# Patient Record
Sex: FEMALE | Race: BLACK OR AFRICAN AMERICAN | URBAN - METROPOLITAN AREA
[De-identification: names, ages, dates, MRNs, and addresses within clinical notes are randomized per-mention and may not be internally consistent; named-entity substitution may affect disease eponyms.]

---

## 2018-09-25 ENCOUNTER — OFFICE VISIT (OUTPATIENT)
Dept: FAMILY MEDICINE CLINIC | Age: 58
End: 2018-09-25

## 2018-09-25 VITALS
HEIGHT: 62 IN | DIASTOLIC BLOOD PRESSURE: 80 MMHG | TEMPERATURE: 99 F | HEART RATE: 77 BPM | RESPIRATION RATE: 18 BRPM | SYSTOLIC BLOOD PRESSURE: 138 MMHG | WEIGHT: 110.8 LBS | BODY MASS INDEX: 20.39 KG/M2 | OXYGEN SATURATION: 98 %

## 2018-09-25 DIAGNOSIS — C85.90 NON-HODGKIN'S LYMPHOMA, UNSPECIFIED BODY REGION, UNSPECIFIED NON-HODGKIN LYMPHOMA TYPE (HCC): ICD-10-CM

## 2018-09-25 DIAGNOSIS — Z23 ENCOUNTER FOR IMMUNIZATION: ICD-10-CM

## 2018-09-25 DIAGNOSIS — Z11.59 NEED FOR HEPATITIS C SCREENING TEST: ICD-10-CM

## 2018-09-25 DIAGNOSIS — M70.71 BURSITIS OF RIGHT HIP, UNSPECIFIED BURSA: ICD-10-CM

## 2018-09-25 DIAGNOSIS — Z00.00 ANNUAL PHYSICAL EXAM: Primary | ICD-10-CM

## 2018-09-25 DIAGNOSIS — Z00.00 ANNUAL PHYSICAL EXAM: ICD-10-CM

## 2018-09-25 NOTE — MR AVS SNAPSHOT
[address] [place] [address]  [address] 6[address] [facility] [address] [phone] Patient: Zulma Ventura MRN: [id number] OCF:[birth year] Visit Information Date & Time Provider Department Dept. Phone Encounter #  
 9/25/2018  1:00 PM Dinorah Carbajal, 3 [facility] [phone] [id number] Upcoming Health Maintenance Date Due Hepatitis C Screening 1960 DTaP/Tdap/Td series (1 - Tdap) 1/18/1980 Shingrix Vaccine Age 50> (1 of 2) 1/18/2009 BREAST CANCER SCRN MAMMOGRAM 1/18/2009 FOBT Q 1 YEAR AGE 50-75 1/18/2009 PAP AKA CERVICAL CYTOLOGY 12/12/2016 Influenza Age 5 to Adult 8/1/2018 Allergies as of 9/25/2018  Review Complete On: 9/25/2018 By: Dinorah Carbajal MD  
 No Known Allergies Current Immunizations  Never Reviewed Name Date Influenza Vaccine (Quad) PF  Incomplete Not reviewed this visit You Were Diagnosed With   
  
 Codes Comments Annual physical exam    -  Primary ICD-10-CM: Z00.00 ICD-9-CM: V70.0 Non-Hodgkin's lymphoma, unspecified body region, unspecified non-Hodgkin lymphoma type ([facility]     ICD-10-CM: C85.90 ICD-9-CM: 202.80 Bursitis of right hip, unspecified bursa     ICD-10-CM: M70.71 ICD-9-CM: 726.5 Need for hepatitis C screening test     ICD-10-CM: Z11.59 
ICD-9-CM: V73.89 Encounter for immunization     ICD-10-CM: C55 ICD-9-CM: V03.89 Vitals BP Pulse Temp Resp Height(growth percentile) Weight(growth percentile) 138/80 (BP 1 Location: Left arm, BP Patient Position: Sitting) 77 99 °F (37.2 °C) (Oral) 18 5' 2\" (1.575 m) 110 lb 12.8 oz (50.3 kg) SpO2 BMI OB Status Smoking Status 98% 20.27 kg/m2 Postmenopausal Never Smoker BMI and BSA Data Body Mass Index Body Surface Area  
 20.27 kg/m 2 1.48 m 2 Preferred Pharmacy Pharmacy Name Phone [address], [address] [facility] [phone] Your Updated Medication List  
  
   
This list is accurate as of 9/25/18  1:41 PM.  Always use your most recent med list.  
  
  
  
  
 PERCOCET 5-325 mg per tablet Generic drug:  oxyCODONE-acetaminophen Take 1 Tab by mouth every four (4) hours as needed. SENOKOT PO Take  by mouth. We Performed the Following INFLUENZA VIRUS VAC QUAD,SPLIT,PRESV FREE SYRINGE IM [id number] CPT(R)] ME IMMUNIZ ADMIN,1 SINGLE/COMB VAC/TOXOID [id number] CPT(R)] REFERRAL TO PHYSICAL THERAPY [ZSI10 Custom] To-Do List   
 09/25/2018 Lab:  CBC WITH AUTOMATED DIFF   
  
 09/25/2018 Lab:  HEPATIC FUNCTION PANEL   
  
 09/25/2018 Lab:  HEPATITIS C AB   
  
 09/25/2018 Lab:  LIPID PANEL   
  
 09/25/2018 Lab:  METABOLIC PANEL, BASIC   
  
 09/25/2018 Lab:  T4, FREE   
  
 09/25/2018 Lab:  TSH 3RD GENERATION   
  
 09/25/2018 Lab:  URINALYSIS W/ RFLX MICROSCOPIC   
  
 09/25/2018 Lab:  VITAMIN D, 25 HYDROXY Referral Information Referral ID Referred By Referred To  
  
 [id number] [facility] Not Available Visits Status Start Date End Date 1 New Request 9/25/18 9/25/19 If your referral has a status of pending review or denied, additional information will be sent to support the outcome of this decision. Patient Instructions Influenza (Flu) Vaccine (Inactivated or Recombinant): What You Need to Know Why get vaccinated? Influenza (\"flu\") is a contagious disease that spreads around the [place] every winter, usually between October and May. Flu is caused by influenza viruses and is spread mainly by coughing, sneezing, and close contact. Anyone can get flu. Flu strikes suddenly and can last several days. Symptoms vary by age, but can include: · Fever/chills. · Sore throat. · Muscle aches. · Fatigue. · Cough. · Headache. · Runny or stuffy nose.  
Flu can also lead to pneumonia and blood infections, and cause diarrhea and seizures in children. If you have a medical condition, such as heart or lung disease, flu can make it worse. Flu is more dangerous for some people. Infants and young children, people 72years of age and older, pregnant women, and people with certain health conditions or a weakened immune system are at greatest risk. Each year thousands of people in the [facility] die from flu, and many more are hospitalized. Flu vaccine can: · Keep you from getting flu. · Make flu less severe if you do get it. · Keep you from spreading flu to your family and other people. Inactivated and recombinant flu vaccines A dose of flu vaccine is recommended every flu season. Children 6 months through 6years of age may need two doses during the same flu season. Everyone else needs only one dose each flu season. Some inactivated flu vaccines contain a very small amount of a mercury-based preservative called thimerosal. Studies have not shown thimerosal in vaccines to be harmful, but flu vaccines that do not contain thimerosal are available. There is no live flu virus in flu shots. They cannot cause the flu. There are many flu viruses, and they are always changing. Each year a new flu vaccine is made to protect against three or four viruses that are likely to cause disease in the upcoming flu season. But even when the vaccine doesn't exactly match these viruses, it may still provide some protection. Flu vaccine cannot prevent: · Flu that is caused by a virus not covered by the vaccine. · Illnesses that look like flu but are not. Some people should not get this vaccine Tell the person who is giving you the vaccine: · If you have any severe (life-threatening) allergies. If you ever had a life-threatening allergic reaction after a dose of flu vaccine, or have a severe allergy to any part of this vaccine, you may be advised not to get vaccinated.  Most, but not all, types of flu vaccine contain a small amount of egg protein. · If you ever had Guillain-Barré syndrome (also called GBS) Some people with a history of GBS should not get this vaccine. This should be discussed with your doctor. · If you are not feeling well. It is usually okay to get flu vaccine when you have a mild illness, but you might be asked to come back when you feel better. Risks of a vaccine reaction With any medicine, including vaccines, there is a chance of reactions. These are usually mild and go away on their own, but serious reactions are also possible. Most people who get a flu shot do not have any problems with it. Minor problems following a flu shot include: · Soreness, redness, or swelling where the shot was given · Hoarseness · Sore, red or itchy eyes · Cough · Fever · Aches · Headache · Itching · Fatigue If these problems occur, they usually begin soon after the shot and last 1 or 2 days. More serious problems following a flu shot can include the following: · There may be a small increased risk of Guillain-Barré Syndrome (GBS) after inactivated flu vaccine. This risk has been estimated at 1 or 2 additional cases per million people vaccinated. This is much lower than the risk of severe complications from flu, which can be prevented by flu vaccine. · [employer] children who get the flu shot along with pneumococcal vaccine (PCV13) and/or DTaP vaccine at the same time might be slightly more likely to have a seizure caused by fever. Ask your doctor for more information. Tell your doctor if a child who is getting flu vaccine has ever had a seizure Problems that could happen after any injected vaccine: · People sometimes faint after a medical procedure, including vaccination. Sitting or lying down for about 15 minutes can help prevent fainting, and injuries caused by a fall. Tell your doctor if you feel dizzy, or have vision changes or ringing in the ears.  
· Some people get severe pain in the shoulder and have difficulty moving the arm where a shot was given. This happens very rarely. · Any medication can cause a severe allergic reaction. Such reactions from a vaccine are very rare, estimated at about 1 in a million doses, and would happen within a few minutes to a few hours after the vaccination. As with any medicine, there is a very remote chance of a vaccine causing a serious injury or death. The safety of vaccines is always being monitored. For more information, visit: www.cdc.gov/vaccinesafety/. What if there is a serious reaction? What should I look for? · Look for anything that concerns you, such as signs of a severe allergic reaction, very high fever, or unusual behavior. Signs of a severe allergic reaction can include hives, swelling of the face and throat, difficulty breathing, a fast heartbeat, dizziness, and weakness - usually within a few minutes to a few hours after the vaccination. What should I do? · If you think it is a severe allergic reaction or other emergency that can't wait, call 9-1-1 and get the person to the nearest hospital. Otherwise, call your doctor. · Reactions should be reported to the \"Vaccine Adverse Event Reporting System\" (VAERS). Your doctor should file this report, or you can do it yourself through the VAERS website at www.vaers. [facility].gov, or by calling 3-197.861.9744. [employer] does not give medical advice. The National Vaccine Injury Compensation Program 
The National Vaccine Injury Compensation Program (VICP) is a federal program that was created to compensate people who may have been injured by certain vaccines. Persons who believe they may have been injured by a vaccine can learn about the program and about filing a claim by calling 8-705.438.7218 or visiting the [employer] website at www.[facility].gov/vaccinecompensation. There is a time limit to file a claim for compensation. How can I learn more? · Ask your healthcare provider.  He or she can give you the vaccine package insert or suggest other sources of information. · Call your local or state health department. · Contact the Centers for Disease Control and Prevention (CDC): 
¨ Call 3-671.128.3664 (1-800-CDC-INFO) or ¨ Visit CDC's website at www.cdc.gov/flu Vaccine Information Statement Inactivated Influenza Vaccine 8/7/2015) 42 LUIGI Kay 122SE-45 [facility] and [employer] Centers for Disease Control and Prevention Many Vaccine Information Statements are available in [place] and other languages. See www.immunize.org/vis. Muchas hojas de información sobre vacunas están disponibles en español y en otros idiomas. Visite www.immunize.org/vis. Care instructions adapted under license by [employer] (which disclaims liability or warranty for this information). If you have questions about a medical condition or this instruction, always ask your healthcare professional. [address] any warranty or liability for your use of this information. Introducing [facility]! Eduardo Lima introduces TwentyPeople patient portal. Now you can access parts of your medical record, email your doctor's office, and request medication refills online. 1. In your internet browser, go to [email or web address]. [employer] 2. Click on the First Time User? Click Here link in the Sign In box. You will see the New Member Sign Up page. 3. Enter your TwentyPeople Access Code exactly as it appears below. You will not need to use this code after youve completed the sign-up process. If you do not sign up before the expiration date, you must request a new code. · [employer] Access Code: [email or web address] Expires: 12/24/2018  1:41 PM 
 
4. Enter the last four digits of your Social Security Number (xxxx) and Date of Birth (mm/dd/yyyy) as indicated and click Submit. You will be taken to the next sign-up page. 5. Create a TwentyPeople ID.  This will be your TwentyPeople login ID and cannot be changed, so think of one that is secure and easy to remember. 6. Create a mInfo password. You can change your password at any time. 7. Enter your Password Reset Question and Answer. This can be used at a later time if you forget your password. 8. Enter your e-mail address. You will receive e-mail notification when new information is available in [address] 9. Click Sign Up. You can now view and download portions of your medical record. 10. Click the Download Summary menu link to download a portable copy of your medical information. If you have questions, please visit the Frequently Asked Questions section of the mInfo website. Remember, mInfo is NOT to be used for urgent needs. For medical emergencies, dial 911. Now available from your iPhone and Android! Please provide this summary of care documentation to your next provider. Your primary care clinician is listed as Kristian 51. If you have any questions after today's visit, please call [phone].

## 2018-09-25 NOTE — PATIENT INSTRUCTIONS

## 2018-09-25 NOTE — PROGRESS NOTES
Sunshine Blanca is a 60 y.o. female (: 1960) presenting to address: 
Patient DECLINED flu vaccine. Chief Complaint Patient presents with [facility] Care Vitals:  
 18 1313 BP: 138/80 Pulse: 77 Resp: 18 Temp: 99 °F (37.2 °C) TempSrc: Oral  
SpO2: 98% Weight: 110 lb 12.8 oz (50.3 kg) Height: 5' 2\" (1.575 m) PainSc:   0 - No pain Hearing/Vision:  
 
 Visual Acuity Screening Right eye Left eye Both eyes Without correction:     
With correction: 20/20 20/25 20/20 Learning Assessment:  
 
Learning Assessment 2018 PRIMARY LEARNER Patient HIGHEST LEVEL OF EDUCATION - PRIMARY LEARNER  > 4 YEARS OF COLLEGE  
BARRIERS PRIMARY LEARNER NONE PRIMARY LANGUAGE ENGLISH  
LEARNER PREFERENCE PRIMARY READING  
ANSWERED BY patient RELATIONSHIP SELF Depression Screening: PHQ over the last two weeks 2018 Little interest or pleasure in doing things Not at all Feeling down, depressed, irritable, or hopeless Not at all Total Score PHQ 2 0 Fall Risk Assessment:  
No flowsheet data found. Abuse Screening:  
 
Abuse Screening Questionnaire 2018 Do you ever feel afraid of your partner? Karl Mohr Are you in a relationship with someone who physically or mentally threatens you? Karl Mohr Is it safe for you to go home? Mar Lang Coordination of Care Questionaire: 1. Have you been to the ER, urgent care clinic since your last visit? Hospitalized since your last visit? NO 
 
2. Have you seen or consulted any other health care providers outside of the [address] [place] [address] since your last visit? Include any pap smears or colon screening. NO Advanced Directive: 1. Do you have an Advanced Directive? NO 
 
2. Would you like information on Advanced Directives?  YES

## 2018-09-25 NOTE — PROGRESS NOTES
Flu shot Immunization/s administered 9/25/2018 by Loretta Viera LPN with guardian's consent. Patient tolerated procedure well. No reactions noted.

## 2018-09-25 NOTE — PROGRESS NOTES
SUBJECTIVE:  
60 y.o. female for annual routine checkup. Current Outpatient Prescriptions Medication Sig Dispense Refill  oxyCODONE-acetaminophen (PERCOCET) 5-325 mg per tablet Take 1 Tab by mouth every four (4) hours as needed.  SENNOSIDES (SENOKOT PO) Take  by mouth. Past Medical History:  
Diagnosis Date  Arm pain  Bursitis of hip, right 9/25/2018  Cancer ([facility] [address] 12/2011 Non-Hodkins Lymphoma  Chest pain  Hypercholesterolemia  Hypertension  Non Hodgkin's lymphoma ([facility] 9/25/2018 Allergies: Review of patient's allergies indicates no known allergies. No LMP recorded. Patient is postmenopausal. 
 
 
ROS:  Feeling well. No dyspnea or chest pain on exertion. No abdominal pain, change in bowel habits, black or bloody stools. No urinary tract symptoms. GYN ROS: no breast pain or new or enlarging lumps on self exam. No neurological complaints. OBJECTIVE: The patient appears well, alert, oriented x 3, in no distress. Visit Vitals  /80 (BP 1 Location: Left arm, BP Patient Position: Sitting)  Pulse 77  Temp 99 °F (37.2 °C) (Oral)  Resp 18  Ht 5' 2\" (1.575 m)  Wt 110 lb 12.8 oz (50.3 kg)  SpO2 98%  BMI 20.27 kg/m2 General appearance: alert, cooperative, no distress, appears stated age Head: Normocephalic, without obvious abnormality, atraumatic Ears: normal TM's and external ear canals AU Throat: Lips, mucosa, and tongue normal. Teeth and gums normal 
Neck: supple, symmetrical, trachea midline, no adenopathy, thyroid: not enlarged, symmetric, no tenderness/mass/nodules, no carotid bruit and no JVD Back: symmetric, no curvature. ROM normal. No CVA tenderness. Lungs: clear to auscultation bilaterally Breasts: patient declines to have breast exam. 
Heart: regular rate and rhythm, S1, S2 normal, no murmur, click, rub or gallop Abdomen: soft, non-tender. Bowel sounds normal. No masses,  no organomegaly Extremities: extremities normal, atraumatic, no cyanosis or edema Pulses: 2+ and symmetric Skin: Skin color, texture, turgor normal. No rashes or lesions Neurological is normal, no focal findings. No results found for: WBC, WBCLT, HGBPOC, HGB, HGBP, HCTPOC, HCT, PHCT, RBCH, PLT, MCV, HGBEXT, HCTEXT, PLTEXT, HGBEXT, HCTEXT, PLTEXT No results found for: NA, K, CL, CO2, AGAP, GLU, BUN, CREA, BUCR, GFRAA, GFRNA, CA, GFRAA No results found for: GPT, ALT, SGOT, GGT, GGTP, AP, APIT, APX, CBIL, TBIL, TBILI No results found for: CHOL, CHOLPOCT, CHOLX, CHLST, CHOLV, HDL, HDLPOC, LDL, LDLCPOC, LDLC, DLDLP, VLDLC, VLDL, TGLX, TRIGL, TRIGP, TGLPOCT, CHHD, CHHDX No results found for: TSH, TSH2, TSH3, TSHP, TSHELE, TT3, T3U, T3UP, FRT3, FT3, FT4, FT4P, T4, T4P, FT4T, TT7, TSHEXT, TSHEXT No results found for: Catherine Bhagat, Yimi Rodriguez, Jorge Espinal, VD3RIA ASSESSMENT:  
Diagnoses and all orders for this visit: 
 
1. Annual physical exam 
-     CBC WITH AUTOMATED DIFF; Future 
-     HEPATIC FUNCTION PANEL; Future -     LIPID PANEL; Future -     METABOLIC PANEL, BASIC; Future 
-     TSH 3RD GENERATION; Future -     T4, FREE; Future -     URINALYSIS W/ RFLX MICROSCOPIC; Future -     VITAMIN D, 25 HYDROXY; Future 2. Non-Hodgkin's lymphoma, unspecified body region, unspecified non-Hodgkin lymphoma type ([facility] 3. Bursitis of right hip, unspecified bursa 
-     REFERRAL TO PHYSICAL THERAPY 4. Need for hepatitis C screening test 
-     HEPATITIS C AB; Future 5. Encounter for immunization -     Influenza virus vaccine (QUADRIVALENT PRES FREE SYRINGE) IM ([id number]) -     [place] IMMUNIZ ADMIN,1 SINGLE/COMB VAC/TOXOID PLAN:  
Mammogram: will get copy from [facility]. DEXA: done with [facility]. Colonoscopy: done with Dr. Loras Rinne. pap smear: wit Dr. Umair Kern. Will return for labs.  
return annually or prn  
 
 
 The plan was discussed with the patient. The patient verbalized understanding and is in agreement with the plan. All medication potential side effects were discussed with the patient.

## 2018-09-28 ENCOUNTER — HOSPITAL ENCOUNTER (OUTPATIENT)
Dept: LAB | Age: 58
Discharge: HOME | End: 2018-09-28

## 2018-09-28 PROCEDURE — 99001 SPECIMEN HANDLING PT-LAB: CPT | Performed by: INTERNAL MEDICINE

## 2018-09-29 LAB
25(OH)D3+25(OH)D2 SERPL-MCNC: 37.3 NG/ML (ref 30–100)
ALBUMIN SERPL-MCNC: 4.6 G/DL (ref 3.5–5.5)
ALP SERPL-CCNC: 92 IU/L (ref 39–117)
ALT SERPL-CCNC: 9 IU/L (ref 0–32)
APPEARANCE UR: CLEAR
AST SERPL-CCNC: 22 IU/L (ref 0–40)
BASOPHILS # BLD AUTO: 0 X10E3/UL (ref 0–0.2)
BASOPHILS NFR BLD AUTO: 0 %
BILIRUB DIRECT SERPL-MCNC: 0.07 MG/DL (ref 0–0.4)
BILIRUB SERPL-MCNC: 0.2 MG/DL (ref 0–1.2)
BILIRUB UR QL STRIP: NEGATIVE
BUN SERPL-MCNC: 11 MG/DL (ref 6–24)
BUN/CREAT SERPL: 14 (ref 9–23)
CALCIUM SERPL-MCNC: 9.9 MG/DL (ref 8.7–10.2)
CHLORIDE SERPL-SCNC: 105 MMOL/L (ref 96–106)
CHOLEST SERPL-MCNC: 204 MG/DL (ref 100–199)
CO2 SERPL-SCNC: 25 MMOL/L (ref 20–29)
COLOR UR: YELLOW
CREAT SERPL-MCNC: 0.79 MG/DL (ref 0.57–1)
EOSINOPHIL # BLD AUTO: 0.1 X10E3/UL (ref 0–0.4)
EOSINOPHIL NFR BLD AUTO: 4 %
ERYTHROCYTE [DISTWIDTH] IN BLOOD BY AUTOMATED COUNT: 13.4 % (ref 12.3–15.4)
GLUCOSE SERPL-MCNC: 92 MG/DL (ref 65–99)
GLUCOSE UR QL: NEGATIVE
HCT VFR BLD AUTO: 36.3 % (ref 34–46.6)
HCV AB S/CO SERPL IA: <0.1 S/CO RATIO (ref 0–0.9)
HDLC SERPL-MCNC: 79 MG/DL
HGB BLD-MCNC: 11.5 G/DL (ref 11.1–15.9)
HGB UR QL STRIP: NEGATIVE
IMM GRANULOCYTES # BLD: 0 X10E3/UL (ref 0–0.1)
IMM GRANULOCYTES NFR BLD: 0 %
INTERPRETATION, 910389: NORMAL
KETONES UR QL STRIP: NEGATIVE
LDLC SERPL CALC-MCNC: 115 MG/DL (ref 0–99)
LEUKOCYTE ESTERASE UR QL STRIP: NEGATIVE
LYMPHOCYTES # BLD AUTO: 1.2 X10E3/UL (ref 0.7–3.1)
LYMPHOCYTES NFR BLD AUTO: 51 %
MCH RBC QN AUTO: 28.3 PG (ref 26.6–33)
MCHC RBC AUTO-ENTMCNC: 31.7 G/DL (ref 31.5–35.7)
MCV RBC AUTO: 89 FL (ref 79–97)
MICRO URNS: NORMAL
MONOCYTES # BLD AUTO: 0.2 X10E3/UL (ref 0.1–0.9)
MONOCYTES NFR BLD AUTO: 9 %
MORPHOLOGY BLD-IMP: ABNORMAL
NEUTROPHILS # BLD AUTO: 0.8 X10E3/UL (ref 1.4–7)
NEUTROPHILS NFR BLD AUTO: 36 %
NITRITE UR QL STRIP: NEGATIVE
PH UR STRIP: 5 [PH] (ref 5–7.5)
PLATELET # BLD AUTO: 214 X10E3/UL (ref 150–379)
POTASSIUM SERPL-SCNC: 4.2 MMOL/L (ref 3.5–5.2)
PROT SERPL-MCNC: 6.6 G/DL (ref 6–8.5)
PROT UR QL STRIP: NEGATIVE
RBC # BLD AUTO: 4.06 X10E6/UL (ref 3.77–5.28)
SODIUM SERPL-SCNC: 144 MMOL/L (ref 134–144)
SP GR UR: 1.02 (ref 1–1.03)
T4 FREE SERPL-MCNC: 1.17 NG/DL (ref 0.82–1.77)
TRIGL SERPL-MCNC: 49 MG/DL (ref 0–149)
TSH SERPL DL<=0.005 MIU/L-ACNC: 2.18 UIU/ML (ref 0.45–4.5)
UROBILINOGEN UR STRIP-MCNC: 0.2 MG/DL (ref 0.2–1)
VLDLC SERPL CALC-MCNC: 10 MG/DL (ref 5–40)
WBC # BLD AUTO: 2.3 X10E3/UL (ref 3.4–10.8)

## 2018-10-05 NOTE — PROGRESS NOTES
Her wbc level is low but I believe this goes with her blood condition. How often does she see heme/onc? Cholesterol mildly elevated. Watch diet and exercise. Rest of labs look good.

## 2018-10-10 ENCOUNTER — DOCUMENTATION ONLY (OUTPATIENT)
Dept: FAMILY MEDICINE CLINIC | Age: 58
End: 2018-10-10

## 2019-01-31 LAB — AMB DEXA, EXTERNAL: NORMAL

## 2019-06-10 ENCOUNTER — OFFICE VISIT (OUTPATIENT)
Dept: FAMILY MEDICINE CLINIC | Age: 59
End: 2019-06-10

## 2019-06-10 VITALS
BODY MASS INDEX: 20.98 KG/M2 | HEIGHT: 62 IN | DIASTOLIC BLOOD PRESSURE: 78 MMHG | OXYGEN SATURATION: 97 % | WEIGHT: 114 LBS | RESPIRATION RATE: 16 BRPM | SYSTOLIC BLOOD PRESSURE: 134 MMHG | HEART RATE: 87 BPM | TEMPERATURE: 98.3 F

## 2019-06-10 DIAGNOSIS — M85.80 OSTEOPENIA, UNSPECIFIED LOCATION: ICD-10-CM

## 2019-06-10 DIAGNOSIS — M25.531 RIGHT WRIST PAIN: ICD-10-CM

## 2019-06-10 DIAGNOSIS — E78.49 OTHER HYPERLIPIDEMIA: Primary | ICD-10-CM

## 2019-06-10 DIAGNOSIS — E78.49 OTHER HYPERLIPIDEMIA: ICD-10-CM

## 2019-06-10 RX ORDER — RALOXIFENE HYDROCHLORIDE 60 MG/1
TABLET, FILM COATED ORAL DAILY
COMMUNITY

## 2019-06-10 NOTE — PROGRESS NOTES
Lai Chester is a 60 y.o. female (: 1960) presenting to address:    Chief Complaint   Patient presents with    Cholesterol Problem     follow up       Vitals:    06/10/19 0745   BP: 134/78   Pulse: 87   Resp: 16   Temp: 98.3 °F (36.8 °C)   TempSrc: Oral   SpO2: 97%   Weight: 114 lb (51.7 kg)   Height: 5' 2\" (1.575 m)   PainSc:   0 - No pain       Hearing/Vision:   No exam data present    Learning Assessment:     Learning Assessment 2018   PRIMARY LEARNER Patient   HIGHEST LEVEL OF EDUCATION - PRIMARY LEARNER  > 4 YEARS OF COLLEGE   BARRIERS PRIMARY LEARNER NONE   PRIMARY LANGUAGE ENGLISH   LEARNER PREFERENCE PRIMARY READING   ANSWERED BY patient   RELATIONSHIP SELF     Depression Screening:     3 most recent PHQ Screens 6/10/2019   Little interest or pleasure in doing things Not at all   Feeling down, depressed, irritable, or hopeless Not at all   Total Score PHQ 2 0     Fall Risk Assessment:   No flowsheet data found. Abuse Screening:     Abuse Screening Questionnaire 2018   Do you ever feel afraid of your partner? N   Are you in a relationship with someone who physically or mentally threatens you? N   Is it safe for you to go home? Y     Coordination of Care Questionaire:   1. Have you been to the ER, urgent care clinic since your last visit? Hospitalized since your last visit? Yes [facility]    2. Have you seen or consulted any other health care providers outside of the [address] [place] [address] since your last visit? Include any pap smears or colon screening. NO    Advanced Directive:   1. Do you have an Advanced Directive? NO    2. Would you like information on Advanced Directives?  NO

## 2019-06-10 NOTE — PATIENT INSTRUCTIONS
Cholesterol and Triglycerides Tests: About These Tests What are they? Cholesterol and triglycerides tests measure the amount of fats in your blood. These fats have both \"good\" (HDL) and \"bad\" (LDL) cholesterol. Why are these tests done? These tests are done to help find out your risk of a heart attack and stroke. They can help your doctor find out how well medicine is working for some health problems. How can you prepare for these tests? · Your doctor may tell you to fast before your tests. This means that you do not eat or drink anything except water for 9 to 12 hours before the tests. In most cases, you can take your medicines with water the morning of the test. 
· Do not eat high-fat foods the night before the tests. · Do not drink alcohol or do intense exercise the night before the tests. · Be sure to tell your doctor about all the over-the-counter and prescription medicines and herbs or other supplements you take. They can affect the results of these tests. What happens during these tests? A health professional takes a sample of your blood. What else should you know about these tests? Your cholesterol levels can help your doctor find out your risk for having a heart attack or stroke. But it's not just about your cholesterol. Your doctor uses your cholesterol levels plus other things to calculate your risk. These include: 
· Your blood pressure. · Whether or not you have diabetes. · Your age, sex, and race. · Whether or not you smoke. You and your doctor can talk about whether you need to lower your risk and what treatment is best for you. Where can you learn more? Go to http://yeyo-donald.info/. Enter U551 in the search box to learn more about \"Cholesterol and Triglycerides Tests: About These Tests. \" Current as of: July 22, 2018 Content Version: 11.9 © 5897-5555 [employer].  Care instructions adapted under license by [address] (which disclaims liability or warranty for this information). If you have questions about a medical condition or this instruction, always ask your healthcare professional. [address] any warranty or liability for your use of this information.

## 2019-06-10 NOTE — PROGRESS NOTES
Assessment/Plan:    *Diagnoses and all orders for this visit:    1. Other hyperlipidemia  -     LIPID PANEL; Future    2. Right wrist pain  -     XR WRIST RT AP/LAT/OBL MIN 3V; Future    3. Osteopenia, unspecified location        Physical in Sept / Oct.    Will return for above labs. The plan was discussed with the patient. The patient verbalized understanding and is in agreement with the plan. All medication potential side effects were discussed with the patient.    -------------------------------------------------------------------------------------------------------------------        Sheela Ferreira is a 60 y.o. female and presents with Cholesterol Problem (follow up)         Subjective:  Pt has been doing well. Her only new complaint is from her RT wrist that has been giving her pain. It is mild to moderate and intermittent, certain movements will remind her of this pain. Needs to have her cholesterol repeated, was elevated last visit. She has been started on Evista for her osteopenia, reviewed DEXA report in deeplocal system. Her Gyn is writing the Rx.      ROS:  Review of Systems - Negative except as above        The problem list was updated as a part of today's visit. Patient Active Problem List   Diagnosis Code    Hypertension I10    Non Hodgkin's lymphoma ([facility] [address] C85.90    Bursitis of hip, right M70.71       The PSH, FH were reviewed. SH:  Social History     Tobacco Use    Smoking status: Never Smoker    Smokeless tobacco: Never Used   Substance Use Topics    Alcohol use: No    Drug use: No       Medications/Allergies:  Current Outpatient Medications on File Prior to Visit   Medication Sig Dispense Refill    raloxifene (EVISTA) 60 mg tablet Take  by mouth daily. No current facility-administered medications on file prior to visit.          No Known Allergies      Health Maintenance:   Health Maintenance   Topic Date Due    Pneumococcal 0-64 years (1 of 3 - PCV13) 01/18/1965    Shingrix Vaccine Age 50> (1 of 2) 01/18/2009    Bone Densitometry  03/20/2019    Influenza Age 9 to Adult  08/01/2019    BREAST CANCER SCRN MAMMOGRAM  01/08/2021    PAP AKA CERVICAL CYTOLOGY  01/15/2022    COLONOSCOPY  01/24/2023    Bone Densitometry (Dexa) Screening  01/18/2024    DTaP/Tdap/Td series (2 - Td) 12/16/2025    Hepatitis C Screening  Completed       Objective:  Visit Vitals  /78 (BP 1 Location: Left arm, BP Patient Position: Sitting)   Pulse 87   Temp 98.3 °F (36.8 °C) (Oral)   Resp 16   Ht 5' 2\" (1.575 m)   Wt 114 lb (51.7 kg)   SpO2 97%   BMI 20.85 kg/m²          Nurses notes and VS reviewed. Physical Examination: General appearance - alert, well appearing, and in no distress  Chest - clear to auscultation, no wheezes, rales or rhonchi, symmetric air entry  Heart - normal rate, regular rhythm, normal S1, S2, no murmurs, rubs, clicks or gallops  Musculoskeletal - abnormal exam of right wrist: +swelling along the lateral aspect, TTP. Labwork and Ancillary Studies:    CBC w/Diff  Lab Results   Component Value Date/Time    WBC 2.3 (LL) 09/28/2018 08:35 AM    HGB 11.5 09/28/2018 08:35 AM    PLATELET 072 92/86/6270 08:35 AM         Basic Metabolic Profile  Lab Results   Component Value Date/Time    Sodium 144 09/28/2018 08:35 AM    Potassium 4.2 09/28/2018 08:35 AM    Chloride 105 09/28/2018 08:35 AM    CO2 25 09/28/2018 08:35 AM    Glucose 92 09/28/2018 08:35 AM    BUN 11 09/28/2018 08:35 AM    Creatinine 0.79 09/28/2018 08:35 AM    BUN/Creatinine ratio 14 09/28/2018 08:35 AM    GFR est AA 95 09/28/2018 08:35 AM    GFR est non-AA 82 09/28/2018 08:35 AM    Calcium 9.9 09/28/2018 08:35 AM         LFT  Lab Results   Component Value Date/Time    ALT (SGPT) 9 09/28/2018 08:35 AM    AST (SGOT) 22 09/28/2018 08:35 AM    Alk.  phosphatase 92 09/28/2018 08:35 AM    Bilirubin, direct 0.07 09/28/2018 08:35 AM    Bilirubin, total 0.2 09/28/2018 08:35 AM Cholesterol  Lab Results   Component Value Date/Time    Cholesterol, total 204 (H) 09/28/2018 08:35 AM    HDL Cholesterol 79 09/28/2018 08:35 AM    LDL, calculated 115 (H) 09/28/2018 08:35 AM    Triglyceride 49 09/28/2018 08:35 AM

## 2019-06-25 LAB
CHOLEST SERPL-MCNC: 206 MG/DL (ref 100–199)
HDLC SERPL-MCNC: 96 MG/DL
INTERPRETATION, 910389: NORMAL
LDLC SERPL CALC-MCNC: 101 MG/DL (ref 0–99)
TRIGL SERPL-MCNC: 46 MG/DL (ref 0–149)
VLDLC SERPL CALC-MCNC: 9 MG/DL (ref 5–40)

## 2019-12-31 ENCOUNTER — TELEPHONE (OUTPATIENT)
Dept: FAMILY MEDICINE CLINIC | Age: 59
End: 2019-12-31

## 2019-12-31 NOTE — TELEPHONE ENCOUNTER
Patient has an upcoming appointment for a physical and needs labs placed.  Please place labs    Future Appointments   Date Time Provider Benigno Mary   1/17/2020  7:45 AM John Abbasi MD [facility]

## 2020-01-01 DIAGNOSIS — Z00.00 ANNUAL PHYSICAL EXAM: Primary | ICD-10-CM

## 2020-02-11 ENCOUNTER — OFFICE VISIT (OUTPATIENT)
Dept: FAMILY MEDICINE CLINIC | Age: 60
End: 2020-02-11

## 2020-02-11 VITALS
BODY MASS INDEX: 20.43 KG/M2 | TEMPERATURE: 99.5 F | WEIGHT: 111 LBS | OXYGEN SATURATION: 99 % | RESPIRATION RATE: 16 BRPM | HEIGHT: 62 IN | DIASTOLIC BLOOD PRESSURE: 84 MMHG | HEART RATE: 80 BPM | SYSTOLIC BLOOD PRESSURE: 150 MMHG

## 2020-02-11 DIAGNOSIS — Z00.00 ANNUAL PHYSICAL EXAM: Primary | ICD-10-CM

## 2020-02-11 DIAGNOSIS — Z23 ENCOUNTER FOR IMMUNIZATION: ICD-10-CM

## 2020-02-11 DIAGNOSIS — T45.1X5A CHEMOTHERAPY-INDUCED NEUROPATHY (HCC): ICD-10-CM

## 2020-02-11 DIAGNOSIS — R07.89 OTHER CHEST PAIN: ICD-10-CM

## 2020-02-11 DIAGNOSIS — E78.49 OTHER HYPERLIPIDEMIA: ICD-10-CM

## 2020-02-11 DIAGNOSIS — G62.0 CHEMOTHERAPY-INDUCED NEUROPATHY (HCC): ICD-10-CM

## 2020-02-11 DIAGNOSIS — I10 ESSENTIAL HYPERTENSION: ICD-10-CM

## 2020-02-11 RX ORDER — GABAPENTIN 300 MG/1
300 CAPSULE ORAL
Qty: 30 CAP | Refills: 0 | Status: SHIPPED | OUTPATIENT
Start: 2020-02-11 | End: 2020-05-27 | Stop reason: SDUPTHER

## 2020-02-11 NOTE — PROGRESS NOTES
SUBJECTIVE:   63 y.o. female for annual routine checkup. Reports ongoing issue with neuropathic pain involving the LT back region for years. 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 She did well for a duration when the pain just resolved on its own but in recent months, it has returned. Not a daily pain but can occur suddenly and can disturb her sleep. Has also had an on going issue with pain in her chest.  Is followed regularly by Dr. Marce Zepeda for her previous Lymphoma, is up to date with her mammo as well. Current Outpatient Medications   Medication Sig Dispense Refill    calcium-cholecalciferol, d3, (CALCIUM 600 + D) 600-125 mg-unit tab Take  by mouth two (2) times a day.  raloxifene (EVISTA) 60 mg tablet Take  by mouth daily. Past Medical History:   Diagnosis Date    Arm pain     Bursitis of hip, right 9/25/2018    Cancer ([facility] [address] 12/2011    Non-Hodkins Lymphoma    Chest pain     Hypercholesterolemia     Hypertension     Non Hodgkin's lymphoma ([facility] [address] 9/25/2018    Osteopenia     managed by her Gynecologist       Allergies: Patient has no known allergies. No LMP recorded. Patient is postmenopausal.      ROS:  Feeling well. No dyspnea or chest pain on exertion. No abdominal pain, change in bowel habits, black or bloody stools. No urinary tract symptoms. GYN ROS: no breast pain or new or enlarging lumps on self exam. No neurological complaints. OBJECTIVE:   The patient appears well, alert, oriented x 3, in no distress.     Visit Vitals  /84   Pulse 80   Temp 99.5 °F (37.5 °C) (Oral)   Resp 16   Ht 5' 2\" (1.575 m)   Wt 111 lb (50.3 kg)   SpO2 99%   BMI 20.30 kg/m²       General appearance: alert, cooperative, no distress, appears stated age  Head: Normocephalic, without obvious abnormality, atraumatic  Ears: normal TM's and external ear canals AU  Throat: Lips, mucosa, and tongue normal. Teeth and gums normal  Neck: supple, symmetrical, trachea midline, no adenopathy, thyroid: not enlarged, symmetric, no tenderness/mass/nodules, no carotid bruit and no JVD  Back: symmetric, no curvature. ROM normal. No CVA tenderness. Lungs: clear to auscultation bilaterally  Breasts: patient declines to have breast exam.  Heart: regular rate and rhythm, S1, S2 normal, no murmur, click, rub or gallop  Abdomen: soft, non-tender. Bowel sounds normal. No masses,  no organomegaly  Extremities: extremities normal, atraumatic, no cyanosis or edema  Pulses: 2+ and symmetric  Skin: Skin color, texture, turgor normal. No rashes or lesions  Neurological is normal, no focal findings. Lab Results   Component Value Date/Time    WBC 2.3 (LL) 09/28/2018 08:35 AM    HGB 11.5 09/28/2018 08:35 AM    HCT 36.3 09/28/2018 08:35 AM    PLATELET 413 06/76/6479 08:35 AM    MCV 89 09/28/2018 08:35 AM         Lab Results   Component Value Date/Time    Sodium 144 09/28/2018 08:35 AM    Potassium 4.2 09/28/2018 08:35 AM    Chloride 105 09/28/2018 08:35 AM    CO2 25 09/28/2018 08:35 AM    Glucose 92 09/28/2018 08:35 AM    BUN 11 09/28/2018 08:35 AM    Creatinine 0.79 09/28/2018 08:35 AM    BUN/Creatinine ratio 14 09/28/2018 08:35 AM    GFR est AA 95 09/28/2018 08:35 AM    GFR est non-AA 82 09/28/2018 08:35 AM    Calcium 9.9 09/28/2018 08:35 AM         Lab Results   Component Value Date/Time    ALT (SGPT) 9 09/28/2018 08:35 AM    AST (SGOT) 22 09/28/2018 08:35 AM    Alk.  phosphatase 92 09/28/2018 08:35 AM    Bilirubin, direct 0.07 09/28/2018 08:35 AM    Bilirubin, total 0.2 09/28/2018 08:35 AM         Lab Results   Component Value Date/Time    Cholesterol, total 206 (H) 06/24/2019 08:43 AM    HDL Cholesterol 96 06/24/2019 08:43 AM    LDL, calculated 101 (H) 06/24/2019 08:43 AM    VLDL, calculated 9 06/24/2019 08:43 AM    Triglyceride 46 06/24/2019 08:43 AM         Lab Results   Component Value Date/Time TSH 2.180 09/28/2018 08:35 AM    T4, Free 1.17 09/28/2018 08:35 AM         Lab Results   Component Value Date/Time    VITAMIN D, 25-HYDROXY 37.3 09/28/2018 08:35 AM             ASSESSMENT:   Diagnoses and all orders for this visit:    1. Annual physical exam    2. Other hyperlipidemia    3. Essential hypertension          PLAN:   Mammogram: utd. DEXA: utd  Colonoscopy: utd  Pap: with Gyn      BP unusually high. Will return after above tests and will discuss results and BP repeat. Will do BW later this week. The plan was discussed with the patient. The patient verbalized understanding and is in agreement with the plan. All medication potential side effects were discussed with the patient.

## 2020-02-11 NOTE — PROGRESS NOTES
Ange Ulrich is a 63 y.o. female (: 1960) presenting to address:    Chief Complaint   Patient presents with    Chest Pain     left breast area . over a year sometimes more intense . has seen breast specialist for it last year and had MRI done which was normal .     Complete Physical       Vitals:    20 1454   BP: 159/84   Pulse: 80   Resp: 16   Temp: 99.5 °F (37.5 °C)   TempSrc: Oral   SpO2: 99%   Weight: 111 lb (50.3 kg)   Height: 5' 2\" (1.575 m)   PainSc:   2   PainLoc: Chest       Hearing/Vision:      Visual Acuity Screening    Right eye Left eye Both eyes   Without correction:      With correction: 20/25 20/25 20/20       Learning Assessment:     Learning Assessment 2018   PRIMARY LEARNER Patient   HIGHEST LEVEL OF EDUCATION - PRIMARY LEARNER  > 4 YEARS OF COLLEGE   BARRIERS PRIMARY LEARNER NONE   PRIMARY LANGUAGE ENGLISH   LEARNER PREFERENCE PRIMARY READING   ANSWERED BY patient   RELATIONSHIP SELF     Depression Screening:     3 most recent PHQ Screens 2020   Little interest or pleasure in doing things Not at all   Feeling down, depressed, irritable, or hopeless Not at all   Total Score PHQ 2 0     Fall Risk Assessment:     Fall Risk Assessment, last 12 mths 2020   Able to walk? Yes   Fall in past 12 months? No     Abuse Screening:     Abuse Screening Questionnaire 2020   Do you ever feel afraid of your partner? N   Are you in a relationship with someone who physically or mentally threatens you? N   Is it safe for you to go home? Y     Coordination of Care Questionaire:   1. Have you been to the ER, urgent care clinic since your last visit? Hospitalized since your last visit? NO    2. Have you seen or consulted any other health care providers outside of the [address] [place] [address] since your last visit? Include any pap smears or colon screening. NO    Advanced Directive:   1. Do you have an Advanced Directive? NO    2.  Would you like information on Advanced Directives? NO      Flu shot Immunization/s administered 2/11/2020 by Harvest Primrose, LPN with guardian's consent. Patient tolerated procedure well. No reactions noted.

## 2020-02-11 NOTE — PATIENT INSTRUCTIONS
Vaccine Information Statement Influenza (Flu) Vaccine (Inactivated or Recombinant): What You Need to Know Many Vaccine Information Statements are available in [place]ic and other languages. See www.immunize.org/vis Hojas de información sobre vacunas están disponibles en español y en muchos otros idiomas. Visite www.immunize.org/vis 1. Why get vaccinated? Influenza vaccine can prevent influenza (flu). Flu is a contagious disease that spreads around the [place] [facility] every year, usually between October and May. Anyone can get the flu, but it is more dangerous for some people. Infants and young children, people 72years of age and older, pregnant women, and people with certain health conditions or a weakened immune system are at greatest risk of flu complications. Pneumonia, bronchitis, sinus infections and ear infections are examples of flu-related complications. If you have a medical condition, such as heart disease, cancer or diabetes, flu can make it worse. Flu can cause fever and chills, sore throat, muscle aches, fatigue, cough, headache, and runny or stuffy nose. Some people may have vomiting and diarrhea, though this is more common in children than adults. Each year thousands of people in the [facility] die from flu, and many more are hospitalized. Flu vaccine prevents millions of illnesses and flu-related visits to the doctor each year. 2. Influenza vaccines CDC recommends everyone 10months of age and older get vaccinated every flu season. Children 6 months through 6years of age may need 2 doses during a single flu season. Everyone else needs only 1 dose each flu season. It takes about 2 weeks for protection to develop after vaccination. There are many flu viruses, and they are always changing. Each year a new flu vaccine is made to protect against three or four viruses that are likely to cause disease in the upcoming flu season.  Even when the vaccine doesnt exactly match these viruses, it may still provide some protection. Influenza vaccine does not cause flu. Influenza vaccine may be given at the same time as other vaccines. 3. Talk with your health care provider Tell your vaccine provider if the person getting the vaccine: 
 Has had an allergic reaction after a previous dose of influenza vaccine, or has any severe, life-threatening allergies.  Has ever had Guillain-Barré Syndrome (also called GBS). In some cases, your health care provider may decide to postpone influenza vaccination to a future visit. People with minor illnesses, such as a cold, may be vaccinated. People who are moderately or severely ill should usually wait until they recover before getting influenza vaccine. Your health care provider can give you more information. 4. Risks of a reaction  Soreness, redness, and swelling where shot is given, fever, muscle aches, and headache can happen after influenza vaccine.  There may be a very small increased risk of Guillain-Barré Syndrome (GBS) after inactivated influenza vaccine (the flu shot). [facility] children who get the flu shot along with pneumococcal vaccine (PCV13), and/or DTaP vaccine at the same time might be slightly more likely to have a seizure caused by fever. Tell your health care provider if a child who is getting flu vaccine has ever had a seizure. People sometimes faint after medical procedures, including vaccination. Tell your provider if you feel dizzy or have vision changes or ringing in the ears. As with any medicine, there is a very remote chance of a vaccine causing a severe allergic reaction, other serious injury, or death. 5. What if there is a serious problem? An allergic reaction could occur after the vaccinated person leaves the clinic.  If you see signs of a severe allergic reaction (hives, swelling of the face and throat, difficulty breathing, a fast heartbeat, dizziness, or weakness), call 9-1-1 and get the person to the nearest hospital. 
 
For other signs that concern you, call your health care provider. Adverse reactions should be reported to the Vaccine Adverse Event Reporting System (VAERS). Your health care provider will usually file this report, or you can do it yourself. Visit the VAERS website at www.vaers. hhs.gov or call 4-427.479.2701. VAERS is only for reporting reactions, and VAERS staff do not give medical advice. 6. The National Vaccine Injury Compensation Program 
 
The [facility] Vaccine Injury Compensation Program (VICP) is a federal program that was created to compensate people who may have been injured by certain vaccines. Visit the VICP website at www.[facility].gov/vaccinecompensation or call 1-783.995.8498 to learn about the program and about filing a claim. There is a time limit to file a claim for compensation. 7. How can I learn more?  Ask your health care provider.  Call your local or state health department.  Contact the Centers for Disease Control and Prevention (CDC): 
- Call 6-887.489.3789 (9-512-XOE-INFO) or 
- Visit CDCs influenza website at www.cdc.gov/flu Vaccine Information Statement (Interim) Inactivated Influenza Vaccine 8/15/2019 
42 LUIGI Uriarte [id number] Department of Health and [employer] Centers for Disease Control and Prevention Office Use Only Well Visit, Women 48 to 72: Care Instructions Your Care Instructions Physical exams can help you stay healthy. Your doctor has checked your overall health and may have suggested ways to take good care of yourself. He or she also may have recommended tests. At home, you can help prevent illness with healthy eating, regular exercise, and other steps. Follow-up care is a key part of your treatment and safety. Be sure to make and go to all appointments, and call your doctor if you are having problems.  It's also a good idea to know your test results and keep a list of the medicines you take. How can you care for yourself at home? · Reach and stay at a healthy weight. This will lower your risk for many problems, such as obesity, diabetes, heart disease, and high blood pressure. · Get at least 30 minutes of exercise on most days of the week. Walking is a good choice. You also may want to do other activities, such as running, swimming, cycling, or playing tennis or team sports. · Do not smoke. Smoking can make health problems worse. If you need help quitting, talk to your doctor about stop-smoking programs and medicines. These can increase your chances of quitting for good. · Protect your skin from too much sun. When you're outdoors from 10 a.m. to 4 p.m., stay in the shade or cover up with clothing and a hat with a wide brim. Wear sunglasses that block UV rays. Even when it's cloudy, put broad-spectrum sunscreen (SPF 30 or higher) on any exposed skin. · See a dentist one or two times a year for checkups and to have your teeth cleaned. · Wear a seat belt in the car. Follow your doctor's advice about when to have certain tests. These tests can spot problems early. · Cholesterol. Your doctor will tell you how often to have this done based on your age, family history, or other things that can increase your risk for heart attack and stroke. · Blood pressure. Have your blood pressure checked during a routine doctor visit. Your doctor will tell you how often to check your blood pressure based on your age, your blood pressure results, and other factors. · Mammogram. Ask your doctor how often you should have a mammogram, which is an X-ray of your breasts. A mammogram can spot breast cancer before it can be felt and when it is easiest to treat. · Pap test and pelvic exam. Ask your doctor how often you should have a Pap test. You may not need to have a Pap test as often as you used to. · Vision.  Have your eyes checked every year or two or as often as your doctor suggests. Some experts recommend that you have yearly exams for glaucoma and other age-related eye problems starting at age 48. · Hearing. Tell your doctor if you notice any change in your hearing. You can have tests to find out how well you hear. · Diabetes. Ask your doctor whether you should have tests for diabetes. · Colorectal cancer. Your risk for colorectal cancer gets higher as you get older. Some experts say that adults should start regular screening at age 48 and stop at age 76. Others say to start before age 48 or continue after age 76. Talk with your doctor about your risk and when to start and stop screening. · Thyroid disease. Talk to your doctor about whether to have your thyroid checked as part of a regular physical exam. Women have an increased chance of a thyroid problem. · Osteoporosis. You should begin tests for bone density at age 72. If you are younger than 72, ask your doctor whether you have factors that may increase your risk for this disease. You may want to have this test before age 72. · Heart attack and stroke risk. At least every 4 to 6 years, you should have your risk for heart attack and stroke assessed. Your doctor uses factors such as your age, blood pressure, cholesterol, and whether you smoke or have diabetes to show what your risk for a heart attack or stroke is over the next 10 years. When should you call for help? Watch closely for changes in your health, and be sure to contact your doctor if you have any problems or symptoms that concern you. Where can you learn more? Go to http://yeyo-donald.info/. Enter X105 in the search box to learn more about \"Well Visit, Women 50 to 72: Care Instructions. \" Current as of: December 13, 2018 Content Version: 12.2 © 2677-3574 [employer].  Care instructions adapted under license by [employer] (which disclaims liability or warranty for this information). If you have questions about a medical condition or this instruction, always ask your healthcare professional. [place] [address] any warranty or liability for your use of this information.

## 2020-02-24 ENCOUNTER — TELEPHONE (OUTPATIENT)
Dept: FAMILY MEDICINE CLINIC | Age: 60
End: 2020-02-24

## 2020-02-24 NOTE — TELEPHONE ENCOUNTER
I have reviewed her barium swallow. Her esophagus looks good no narrowing or tumors but it did show significant acid reflux. I would like her to try a course of prilosec 40 mg every day x 4 weeks. Then come off it and see how she feels. Needs to be taken first thing in AM, wait 40 mins before eating anything.  (pend Rx after talking with her).

## 2020-02-25 RX ORDER — OMEPRAZOLE 40 MG/1
40 CAPSULE, DELAYED RELEASE ORAL DAILY
Qty: 30 CAP | Refills: 0 | Status: SHIPPED | OUTPATIENT
Start: 2020-02-25 | End: 2020-04-23 | Stop reason: SDUPTHER

## 2020-02-26 ENCOUNTER — TELEPHONE (OUTPATIENT)
Dept: FAMILY MEDICINE CLINIC | Age: 60
End: 2020-02-26

## 2020-02-26 LAB
25(OH)D3+25(OH)D2 SERPL-MCNC: 33.8 NG/ML (ref 30–100)
ALBUMIN SERPL-MCNC: 4.4 G/DL (ref 3.8–4.8)
ALP SERPL-CCNC: 57 IU/L (ref 39–117)
ALT SERPL-CCNC: 11 IU/L (ref 0–32)
APPEARANCE UR: CLEAR
AST SERPL-CCNC: 25 IU/L (ref 0–40)
BACTERIA #/AREA URNS HPF: NORMAL /[HPF]
BASOPHILS # BLD AUTO: 0 X10E3/UL (ref 0–0.2)
BASOPHILS NFR BLD AUTO: 1 %
BILIRUB DIRECT SERPL-MCNC: 0.09 MG/DL (ref 0–0.4)
BILIRUB SERPL-MCNC: 0.3 MG/DL (ref 0–1.2)
BILIRUB UR QL STRIP: NEGATIVE
BUN SERPL-MCNC: 13 MG/DL (ref 8–27)
BUN/CREAT SERPL: 17 (ref 12–28)
CALCIUM SERPL-MCNC: 9.4 MG/DL (ref 8.7–10.3)
CASTS URNS QL MICRO: NORMAL /LPF
CHLORIDE SERPL-SCNC: 104 MMOL/L (ref 96–106)
CHOLEST SERPL-MCNC: 216 MG/DL (ref 100–199)
CO2 SERPL-SCNC: 24 MMOL/L (ref 20–29)
COLOR UR: YELLOW
CREAT SERPL-MCNC: 0.77 MG/DL (ref 0.57–1)
EOSINOPHIL # BLD AUTO: 0.1 X10E3/UL (ref 0–0.4)
EOSINOPHIL NFR BLD AUTO: 3 %
EPI CELLS #/AREA URNS HPF: NORMAL /HPF (ref 0–10)
ERYTHROCYTE [DISTWIDTH] IN BLOOD BY AUTOMATED COUNT: 12.6 % (ref 11.7–15.4)
GLUCOSE SERPL-MCNC: 91 MG/DL (ref 65–99)
GLUCOSE UR QL: NEGATIVE
HCT VFR BLD AUTO: 36.8 % (ref 34–46.6)
HDLC SERPL-MCNC: 91 MG/DL
HGB BLD-MCNC: 12 G/DL (ref 11.1–15.9)
HGB UR QL STRIP: NEGATIVE
IMM GRANULOCYTES # BLD AUTO: 0 X10E3/UL (ref 0–0.1)
IMM GRANULOCYTES NFR BLD AUTO: 0 %
INTERPRETATION, 910389: NORMAL
KETONES UR QL STRIP: NEGATIVE
LDLC SERPL CALC-MCNC: 116 MG/DL (ref 0–99)
LEUKOCYTE ESTERASE UR QL STRIP: ABNORMAL
LYMPHOCYTES # BLD AUTO: 1.6 X10E3/UL (ref 0.7–3.1)
LYMPHOCYTES NFR BLD AUTO: 42 %
MCH RBC QN AUTO: 29.6 PG (ref 26.6–33)
MCHC RBC AUTO-ENTMCNC: 32.6 G/DL (ref 31.5–35.7)
MCV RBC AUTO: 91 FL (ref 79–97)
MICRO URNS: ABNORMAL
MONOCYTES # BLD AUTO: 0.4 X10E3/UL (ref 0.1–0.9)
MONOCYTES NFR BLD AUTO: 10 %
MUCOUS THREADS URNS QL MICRO: PRESENT
NEUTROPHILS # BLD AUTO: 1.6 X10E3/UL (ref 1.4–7)
NEUTROPHILS NFR BLD AUTO: 44 %
NITRITE UR QL STRIP: NEGATIVE
PH UR STRIP: 5 [PH] (ref 5–7.5)
PLATELET # BLD AUTO: 223 X10E3/UL (ref 150–450)
POTASSIUM SERPL-SCNC: 4.4 MMOL/L (ref 3.5–5.2)
PROT SERPL-MCNC: 6.7 G/DL (ref 6–8.5)
PROT UR QL STRIP: NEGATIVE
RBC # BLD AUTO: 4.06 X10E6/UL (ref 3.77–5.28)
RBC #/AREA URNS HPF: NORMAL /HPF (ref 0–2)
SODIUM SERPL-SCNC: 141 MMOL/L (ref 134–144)
SP GR UR: 1.02 (ref 1–1.03)
T4 FREE SERPL-MCNC: 1.15 NG/DL (ref 0.82–1.77)
TRIGL SERPL-MCNC: 43 MG/DL (ref 0–149)
TSH SERPL DL<=0.005 MIU/L-ACNC: 2.34 UIU/ML (ref 0.45–4.5)
UROBILINOGEN UR STRIP-MCNC: 0.2 MG/DL (ref 0.2–1)
VLDLC SERPL CALC-MCNC: 9 MG/DL (ref 5–40)
WBC # BLD AUTO: 3.7 X10E3/UL (ref 3.4–10.8)
WBC #/AREA URNS HPF: NORMAL /HPF (ref 0–5)

## 2020-02-27 NOTE — PROGRESS NOTES
Cholesterol is higher. She needs to work on diet and exercise. She was screened for diabetes with her glucose reading. It was NL.

## 2020-03-06 DIAGNOSIS — R07.89 OTHER CHEST PAIN: ICD-10-CM

## 2020-03-17 DIAGNOSIS — R07.89 OTHER CHEST PAIN: ICD-10-CM

## 2020-04-22 ENCOUNTER — TELEPHONE (OUTPATIENT)
Dept: FAMILY MEDICINE CLINIC | Age: 60
End: 2020-04-22

## 2020-04-23 ENCOUNTER — VIRTUAL VISIT (OUTPATIENT)
Dept: FAMILY MEDICINE CLINIC | Age: 60
End: 2020-04-23

## 2020-04-23 DIAGNOSIS — K21.9 GASTROESOPHAGEAL REFLUX DISEASE, ESOPHAGITIS PRESENCE NOT SPECIFIED: Primary | ICD-10-CM

## 2020-04-23 DIAGNOSIS — R07.89 CHEST DISCOMFORT: ICD-10-CM

## 2020-04-23 RX ORDER — OMEPRAZOLE 40 MG/1
40 CAPSULE, DELAYED RELEASE ORAL DAILY
Qty: 90 CAP | Refills: 0 | Status: SHIPPED | OUTPATIENT
Start: 2020-04-23

## 2020-04-23 NOTE — PROGRESS NOTES
Sabas Russell is a 63 y.o. female who was seen by synchronous (real-time) audio-video technology on 4/23/2020. Consent: Sabas Russell, who was seen by synchronous (real-time) audio-video technology, and/or her healthcare decision maker, is aware that this patient-initiated, Telehealth encounter on 4/23/2020 is a billable service, with coverage as determined by her insurance carrier. She is aware that she may receive a bill and has provided verbal consent to proceed: Yes. Assessment & Plan:   Diagnoses and all orders for this visit:    1. Gastroesophageal reflux disease, esophagitis presence not specified  -     REFERRAL TO GASTROENTEROLOGY    2. Chest discomfort  -     REFERRAL TO GASTROENTEROLOGY    Other orders  -     omeprazole (PRILOSEC) 40 mg capsule; Take 1 Cap by mouth daily. Indications: gastroesophageal reflux disease        Will start her back on Omeprazole for now. Will send referral to GI so they can make plans to see her when they are ready to get patients back in the office. 712  Subjective:   Claudette A Hollandrew Lara is a 63 y.o. female who was seen for GERD      Pt was seen on a virtual visit today. She was seen back on 2/11/20 for a chest pain that had been an on-going issue. She had addressed this issue with other doctors before she addressed it with me but did not seem to get any firm explanation as to what was the cause of her chest pain. We ordered an EKG, which was NL and an exercise stress test which also was NL. A barium swallow was done and revealed that she had a significant degree of acid reflux in the esophagus. We put her on a trial of Omeprazole for 4 weeks. She did have some improvement in her symptoms during this time, not complete resolution but definite improvement. She has been off meds now for some weeks, monitoring to see what would happen.   She has had the return of some nausea, upset stomach, sensation of a lump in her throat and acidity since then.  The next step was to be referral to GI but currently dealing with the Covid pandemic. Prior to Admission medications    Medication Sig Start Date End Date Taking? Authorizing Provider   omeprazole (PRILOSEC) 40 mg capsule Take 1 Cap by mouth daily. Indications: gastroesophageal reflux disease 4/23/20  Yes Doretha Smith MD   omeprazole (PRILOSEC) 40 mg capsule Take 1 Cap by mouth daily. 2/25/20 4/23/20  Doretha Smith MD   calcium-cholecalciferol, d3, (CALCIUM 600 + D) 600-125 mg-unit tab Take  by mouth two (2) times a day. Provider, Historical   gabapentin (NEURONTIN) 300 mg capsule Take 1 Cap by mouth nightly as needed for Pain. Max Daily Amount: 300 mg. 2/11/20   Doretha Smith MD   raloxifene (EVISTA) 60 mg tablet Take  by mouth daily. Provider, Historical     No Known Allergies    Patient Active Problem List   Diagnosis Code    Hypertension I10    Non Hodgkin's lymphoma ([facility] [address] C85.90    Bursitis of hip, right M70.71    Chemotherapy-induced neuropathy (HCC) G62.0, T45.1X5A     Current Outpatient Medications   Medication Sig Dispense Refill    omeprazole (PRILOSEC) 40 mg capsule Take 1 Cap by mouth daily. Indications: gastroesophageal reflux disease 90 Cap 0    calcium-cholecalciferol, d3, (CALCIUM 600 + D) 600-125 mg-unit tab Take  by mouth two (2) times a day.  gabapentin (NEURONTIN) 300 mg capsule Take 1 Cap by mouth nightly as needed for Pain. Max Daily Amount: 300 mg. 30 Cap 0    raloxifene (EVISTA) 60 mg tablet Take  by mouth daily.        No Known Allergies  Past Medical History:   Diagnosis Date    Arm pain     Bursitis of hip, right 9/25/2018    Cancer ([facility] [address] 12/2011    Non-Hodkins Lymphoma    Chemotherapy-induced neuropathy (HCC)     affecting the LT back area    Chest pain     Hypercholesterolemia     Hypertension     Non Hodgkin's lymphoma ([facility] [address] 9/25/2018    Osteopenia     managed by her Gynecologist     Past Surgical History:   Procedure Laterality Date    HX OOPHORECTOMY Left 1993       ROS      Objective: There were no vitals taken for this visit. General: alert, cooperative, no distress   Mental  status: normal mood, behavior, speech, dress, motor activity, and thought processes, able to follow commands   HENT: NCAT   Neck: no visualized mass   Resp: no respiratory distress   Neuro: no gross deficits   Skin: no discoloration or lesions of concern on visible areas   Psychiatric: normal affect, consistent with stated mood, no evidence of hallucinations     Additional exam findings: We discussed the expected course, resolution and complications of the diagnosis(es) in detail. Medication risks, benefits, costs, interactions, and alternatives were discussed as indicated. I advised her to contact the office if her condition worsens, changes or fails to improve as anticipated. She expressed understanding with the diagnosis(es) and plan. Usman Nelson is a 63 y.o. female who was evaluated by a video visit encounter for concerns as above. Patient identification was verified prior to start of the visit. A caregiver was present when appropriate. Due to this being a TeleHealth encounter (During AQBER-24 public health emergency), evaluation of the following organ systems was limited: Vitals/Constitutional/EENT/Resp/CV/GI//MS/Neuro/Skin/Heme-Lymph-Imm. Pursuant to the emergency declaration under the [facility], [facility]5 waiver authority and the [employer] Act, this Virtual  Visit was conducted, with patient's (and/or legal guardian's) consent, to reduce the patient's risk of exposure to COVID-19 and provide necessary medical care. Services were provided through a video synchronous discussion virtually to substitute for in-person clinic visit. Patient and provider were located at their individual homes.       Lennox Cummins MD

## 2020-05-27 DIAGNOSIS — G62.0 CHEMOTHERAPY-INDUCED NEUROPATHY (HCC): ICD-10-CM

## 2020-05-27 DIAGNOSIS — T45.1X5A CHEMOTHERAPY-INDUCED NEUROPATHY (HCC): ICD-10-CM

## 2020-05-27 RX ORDER — GABAPENTIN 300 MG/1
300 CAPSULE ORAL
Qty: 30 CAP | Refills: 2 | Status: SHIPPED | OUTPATIENT
Start: 2020-05-27